# Patient Record
Sex: MALE | Race: OTHER | HISPANIC OR LATINO | URBAN - METROPOLITAN AREA
[De-identification: names, ages, dates, MRNs, and addresses within clinical notes are randomized per-mention and may not be internally consistent; named-entity substitution may affect disease eponyms.]

---

## 2020-02-09 ENCOUNTER — EMERGENCY (EMERGENCY)
Facility: HOSPITAL | Age: 45
LOS: 1 days | Discharge: HOME | End: 2020-02-09
Attending: EMERGENCY MEDICINE | Admitting: EMERGENCY MEDICINE
Payer: COMMERCIAL

## 2020-02-09 VITALS
HEART RATE: 73 BPM | SYSTOLIC BLOOD PRESSURE: 164 MMHG | TEMPERATURE: 98 F | OXYGEN SATURATION: 98 % | HEIGHT: 66 IN | DIASTOLIC BLOOD PRESSURE: 83 MMHG | WEIGHT: 272.93 LBS | RESPIRATION RATE: 18 BRPM

## 2020-02-09 VITALS
DIASTOLIC BLOOD PRESSURE: 82 MMHG | TEMPERATURE: 98 F | HEART RATE: 74 BPM | SYSTOLIC BLOOD PRESSURE: 158 MMHG | OXYGEN SATURATION: 99 % | RESPIRATION RATE: 16 BRPM

## 2020-02-09 PROCEDURE — 29515 APPLICATION SHORT LEG SPLINT: CPT | Mod: LT

## 2020-02-09 PROCEDURE — 29515 APPLICATION SHORT LEG SPLINT: CPT

## 2020-02-09 PROCEDURE — 99283 EMERGENCY DEPT VISIT LOW MDM: CPT

## 2020-02-09 PROCEDURE — 73610 X-RAY EXAM OF ANKLE: CPT

## 2020-02-09 PROCEDURE — 73630 X-RAY EXAM OF FOOT: CPT | Mod: 26,LT

## 2020-02-09 PROCEDURE — 73630 X-RAY EXAM OF FOOT: CPT

## 2020-02-09 PROCEDURE — 99283 EMERGENCY DEPT VISIT LOW MDM: CPT | Mod: 25

## 2020-02-09 PROCEDURE — 73610 X-RAY EXAM OF ANKLE: CPT | Mod: 26,LT

## 2020-02-09 RX ORDER — IBUPROFEN 200 MG
600 TABLET ORAL ONCE
Refills: 0 | Status: COMPLETED | OUTPATIENT
Start: 2020-02-09 | End: 2020-02-09

## 2020-02-09 RX ADMIN — Medication 600 MILLIGRAM(S): at 13:55

## 2020-02-09 NOTE — ED ADULT TRIAGE NOTE - HEIGHT IN CM
FOLLOW UP WITH ORTHOPEDICS WITHIN THIS WEEK. REFERRAL LIST GIVEN. SEE YOUR PRIMARY CARE PROVIDER WITHIN 48 HOURS. TAKE IBUPROFEN 600MG (THREE OVER THE COUNTER PILLS) AS NEEDED FOR PAIN. RETURN TO ER FOR WORSENING SYMPTOMS, FEVER, CHILLS, CHEST PAIN, WEAKNESS, BLURRY VISION, NUMBNESS, TINGLING, NAUSEA, VOMITING, HEADACHE, BACK PAIN, URINARY OR FECAL INCONTINENCE OR ANY OTHER CONCERNS. 167.64

## 2020-02-09 NOTE — ED PROVIDER NOTE - PATIENT PORTAL LINK FT
complains of pain/discomfort You can access the FollowMyHealth Patient Portal offered by [facility] by registering at the following website: [email or web address]. By joining [facility]’s FollowMyHealth portal, you will also be able to view your health information using other applications (apps) compatible with our system.

## 2020-02-09 NOTE — ED ADULT TRIAGE NOTE - HEIGHT IN INCHES
GASTROINTESTINAL - PEDIATRIC    • Minimal or absence of nausea and vomiting Completed    • Maintains or returns to baseline bowel function Completed    • Maintains adequate nutritional intake (undernourished) Completed        PAIN - PEDIATRIC    • Lisa Ballard 6

## 2020-02-09 NOTE — ED PROVIDER NOTE - CLINICAL SUMMARY MEDICAL DECISION MAKING FREE TEXT BOX
Pt is a 43 yo male with foot pain xray neg posterior splint placed nvi ortho/podiatry f/u motrin stable for d/c

## 2020-02-09 NOTE — ED PROVIDER NOTE - ATTENDING CONTRIBUTION TO CARE
pt is 45 yo male who began to have mild pain left lateral mid foot last saravanan no trauma no fever no chills no radiation no back pain   this am he had difficulty walking so he came to er for eval  on eval:  wd wn male nad  heent cor chest normal  abd normal  ext: neuro vasc intatc with pain and tender to lateral left mid foot full rom no calf ankle or plantar foot pain no pain on stretch of toes heel calf no erythema but there is mild warmth to touch  plan xray nsxaids splint refer to ortho

## 2020-02-09 NOTE — ED PROVIDER NOTE - CARE PROVIDER_API CALL
Jameson Hart (MD)  Orthopaedic Surgery; Surgery of the Hand  [address] [place] [address]  Phone: [phone]  Fax: [phone]  Follow Up Time:     Timi Bundy (DPM)  Surgery  [address], [address]  [place] [address]  Phone: [phone]  Fax: [phone]  Follow Up Time:

## 2020-02-09 NOTE — ED PROVIDER NOTE - OBJECTIVE STATEMENT
Pt is a 43 yo male with no pmhx c/o left foot pain for one week getting worse with swelling denies any fever chills trauma numbness tingling weakness. Pt denies taking anything for pain had pain with walking today so came to er.

## 2020-02-09 NOTE — ED PROVIDER NOTE - MUSCULOSKELETAL, MLM
Spine appears normal, range of motion is not limited, TTP left lateral aspect of foot mild warmth no break in skin nvi normal strength sensation pulses

## 2020-02-09 NOTE — ED PROVIDER NOTE - NSFOLLOWUPINSTRUCTIONS_ED_ALL_ED_FT
Please elevate ice keep splint dry follow up with podiatrist Take Motrin for pain return to er for any worsening symptoms

## 2025-01-29 ENCOUNTER — APPOINTMENT (OUTPATIENT)
Dept: RADIOLOGY | Facility: CLINIC | Age: 50
End: 2025-01-29
Payer: MEDICAID

## 2025-01-29 PROCEDURE — 73620 X-RAY EXAM OF FOOT: CPT | Mod: LT

## 2025-01-29 PROCEDURE — 73600 X-RAY EXAM OF ANKLE: CPT | Mod: LT

## 2025-02-10 ENCOUNTER — APPOINTMENT (OUTPATIENT)
Age: 50
End: 2025-02-10
Payer: MEDICAID

## 2025-02-10 VITALS — BODY MASS INDEX: 37.77 KG/M2 | WEIGHT: 235 LBS | HEIGHT: 66 IN

## 2025-02-10 DIAGNOSIS — M10.072 IDIOPATHIC GOUT, LEFT ANKLE AND FOOT: ICD-10-CM

## 2025-02-10 DIAGNOSIS — M79.672 PAIN IN LEFT FOOT: ICD-10-CM

## 2025-02-10 PROBLEM — Z00.00 ENCOUNTER FOR PREVENTIVE HEALTH EXAMINATION: Status: ACTIVE | Noted: 2025-02-10

## 2025-02-10 PROCEDURE — 99203 OFFICE O/P NEW LOW 30 MIN: CPT

## 2025-02-10 RX ORDER — INDOMETHACIN 50 MG/1
50 CAPSULE ORAL 3 TIMES DAILY
Qty: 30 | Refills: 0 | Status: ACTIVE | COMMUNITY
Start: 2025-02-10 | End: 1900-01-01